# Patient Record
Sex: MALE | Race: WHITE | NOT HISPANIC OR LATINO | Employment: FULL TIME | URBAN - METROPOLITAN AREA
[De-identification: names, ages, dates, MRNs, and addresses within clinical notes are randomized per-mention and may not be internally consistent; named-entity substitution may affect disease eponyms.]

---

## 2018-09-26 ENCOUNTER — HISTORICAL (OUTPATIENT)
Dept: ADMINISTRATIVE | Facility: HOSPITAL | Age: 58
End: 2018-09-26

## 2018-09-26 LAB
ABS NEUT (OLG): 3.3
ALBUMIN SERPL-MCNC: 4.7 GM/DL (ref 3.4–5)
ALBUMIN/GLOB SERPL: 1.57 {RATIO} (ref 1.5–2.5)
ALP SERPL-CCNC: 65 UNIT/L (ref 38–126)
ALT SERPL-CCNC: 32 UNIT/L (ref 7–52)
APPEARANCE, UA: CLEAR
AST SERPL-CCNC: 26 UNIT/L (ref 15–37)
BACTERIA #/AREA URNS AUTO: ABNORMAL /HPF
BILIRUB SERPL-MCNC: 0.6 MG/DL (ref 0.2–1)
BILIRUB UR QL STRIP: NEGATIVE MG/DL
BILIRUBIN DIRECT+TOT PNL SERPL-MCNC: 0.1 MG/DL (ref 0–0.5)
BILIRUBIN DIRECT+TOT PNL SERPL-MCNC: 0.5 MG/DL
BUN SERPL-MCNC: 16 MG/DL (ref 7–18)
CALCIUM SERPL-MCNC: 9.3 MG/DL (ref 8.5–10)
CHLORIDE SERPL-SCNC: 102 MMOL/L (ref 98–107)
CHOLEST SERPL-MCNC: 244 MG/DL (ref 0–200)
CHOLEST/HDLC SERPL: 5 {RATIO}
CO2 SERPL-SCNC: 33 MMOL/L (ref 21–32)
COLOR UR: YELLOW
CREAT SERPL-MCNC: 1.05 MG/DL (ref 0.6–1.3)
ERYTHROCYTE [DISTWIDTH] IN BLOOD BY AUTOMATED COUNT: 12.4 % (ref 11.5–17)
GLOBULIN SER-MCNC: 3 GM/DL (ref 1.2–3)
GLUCOSE (UA): NEGATIVE MG/DL
GLUCOSE SERPL-MCNC: 103 MG/DL (ref 74–106)
HCT VFR BLD AUTO: 45.5 % (ref 42–52)
HDLC SERPL-MCNC: 49 MG/DL (ref 35–60)
HGB BLD-MCNC: 14.8 GM/DL (ref 14–18)
HGB UR QL STRIP: ABNORMAL UNIT/L
KETONES UR QL STRIP: NEGATIVE MG/DL
LDLC SERPL CALC-MCNC: 156 MG/DL (ref 0–129)
LEUKOCYTE ESTERASE UR QL STRIP: NEGATIVE UNIT/L
LYMPHOCYTES # BLD AUTO: 2 X10(3)/MCL (ref 0.6–3.4)
LYMPHOCYTES NFR BLD AUTO: 34.2 % (ref 13–40)
MCH RBC QN AUTO: 31.1 PG (ref 27–31.2)
MCHC RBC AUTO-ENTMCNC: 32 GM/DL (ref 32–36)
MCV RBC AUTO: 96 FL (ref 80–94)
MONOCYTES # BLD AUTO: 0.6 X10(3)/MCL (ref 0–1.8)
MONOCYTES NFR BLD AUTO: 10.5 % (ref 0.1–24)
NEUTROPHILS NFR BLD AUTO: 55.3 % (ref 47–80)
NITRITE UR QL STRIP.AUTO: NEGATIVE
PH UR STRIP: 7 [PH]
PLATELET # BLD AUTO: 365 X10(3)/MCL (ref 130–400)
PMV BLD AUTO: 8.5 FL
POTASSIUM SERPL-SCNC: 4 MMOL/L (ref 3.5–5.1)
PROT SERPL-MCNC: 7.7 GM/DL (ref 6.4–8.2)
PROT UR QL STRIP: NEGATIVE MG/DL
PSA SERPL-MCNC: 0.76 NG/ML (ref 0–3.5)
RBC # BLD AUTO: 4.76 X10(6)/MCL (ref 4.7–6.1)
RBC #/AREA URNS HPF: ABNORMAL /HPF
SODIUM SERPL-SCNC: 139 MMOL/L (ref 136–145)
SP GR UR STRIP: 1.02
SQUAMOUS EPITHELIAL, UA: ABNORMAL /LPF
TRIGL SERPL-MCNC: 172 MG/DL (ref 30–150)
UROBILINOGEN UR STRIP-ACNC: 0.2 MG/DL
VLDLC SERPL CALC-MCNC: 34.4 MG/DL
WBC # SPEC AUTO: 5.9 X10(3)/MCL (ref 4.5–11.5)
WBC #/AREA URNS AUTO: ABNORMAL /[HPF]

## 2019-09-27 ENCOUNTER — HISTORICAL (OUTPATIENT)
Dept: ADMINISTRATIVE | Facility: HOSPITAL | Age: 59
End: 2019-09-27

## 2019-09-27 LAB
ABS NEUT (OLG): 4 X10(3)/MCL (ref 2.1–9.2)
ALBUMIN SERPL-MCNC: 4.7 GM/DL (ref 3.4–5)
ALBUMIN/GLOB SERPL: 2.24 {RATIO} (ref 1.5–2.5)
ALP SERPL-CCNC: 76 UNIT/L (ref 38–126)
ALT SERPL-CCNC: 36 UNIT/L (ref 7–52)
APPEARANCE, UA: CLEAR
AST SERPL-CCNC: 30 UNIT/L (ref 15–37)
BACTERIA #/AREA URNS AUTO: ABNORMAL /HPF
BILIRUB SERPL-MCNC: 0.6 MG/DL (ref 0.2–1)
BILIRUB UR QL STRIP: NEGATIVE MG/DL
BILIRUBIN DIRECT+TOT PNL SERPL-MCNC: 0.1 MG/DL (ref 0–0.5)
BILIRUBIN DIRECT+TOT PNL SERPL-MCNC: 0.5 MG/DL
BUN SERPL-MCNC: 12 MG/DL (ref 7–18)
CALCIUM SERPL-MCNC: 9.7 MG/DL (ref 8.5–10)
CHLORIDE SERPL-SCNC: 102 MMOL/L (ref 98–107)
CHOLEST SERPL-MCNC: 250 MG/DL (ref 0–200)
CHOLEST/HDLC SERPL: 5 {RATIO}
CO2 SERPL-SCNC: 30 MMOL/L (ref 21–32)
COLOR UR: YELLOW
CREAT SERPL-MCNC: 0.81 MG/DL (ref 0.6–1.3)
ERYTHROCYTE [DISTWIDTH] IN BLOOD BY AUTOMATED COUNT: 12.5 % (ref 11.5–17)
GLOBULIN SER-MCNC: 2.1 GM/DL (ref 1.2–3)
GLUCOSE (UA): NEGATIVE MG/DL
GLUCOSE SERPL-MCNC: 107 MG/DL (ref 74–106)
HCT VFR BLD AUTO: 43.4 % (ref 42–52)
HDLC SERPL-MCNC: 50 MG/DL (ref 35–60)
HGB BLD-MCNC: 14.9 GM/DL (ref 14–18)
HGB UR QL STRIP: ABNORMAL UNIT/L
KETONES UR QL STRIP: NEGATIVE MG/DL
LDLC SERPL CALC-MCNC: 208 MG/DL (ref 0–129)
LEUKOCYTE ESTERASE UR QL STRIP: NEGATIVE UNIT/L
LYMPHOCYTES # BLD AUTO: 1.6 X10(3)/MCL (ref 0.6–3.4)
LYMPHOCYTES NFR BLD AUTO: 25.6 % (ref 13–40)
MCH RBC QN AUTO: 30.9 PG (ref 27–31.2)
MCHC RBC AUTO-ENTMCNC: 34 GM/DL (ref 32–36)
MCV RBC AUTO: 90 FL (ref 80–94)
MONOCYTES # BLD AUTO: 0.6 X10(3)/MCL (ref 0.1–1.3)
MONOCYTES NFR BLD AUTO: 10 % (ref 0.1–24)
NEUTROPHILS NFR BLD AUTO: 64.4 % (ref 47–80)
NITRITE UR QL STRIP.AUTO: NEGATIVE
PH UR STRIP: 5.5 [PH]
PLATELET # BLD AUTO: 390 X10(3)/MCL (ref 130–400)
PMV BLD AUTO: 8.7 FL (ref 9.4–12.4)
POTASSIUM SERPL-SCNC: 4.4 MMOL/L (ref 3.5–5.1)
PROT SERPL-MCNC: 6.8 GM/DL (ref 6.4–8.2)
PROT UR QL STRIP: NEGATIVE MG/DL
PSA SERPL-MCNC: 0.71 NG/ML (ref 0–3.5)
RBC # BLD AUTO: 4.82 X10(6)/MCL (ref 4.7–6.1)
RBC #/AREA URNS HPF: ABNORMAL /HPF
SODIUM SERPL-SCNC: 140 MMOL/L (ref 136–145)
SP GR UR STRIP: 1.02
SQUAMOUS EPITHELIAL, UA: ABNORMAL /LPF
TRIGL SERPL-MCNC: 129 MG/DL (ref 30–150)
UROBILINOGEN UR STRIP-ACNC: 0.2 MG/DL
VLDLC SERPL CALC-MCNC: 25.8 MG/DL
WBC # SPEC AUTO: 6.2 X10(3)/MCL (ref 4.5–11.5)
WBC #/AREA URNS AUTO: ABNORMAL /[HPF]

## 2021-03-11 ENCOUNTER — HISTORICAL (OUTPATIENT)
Dept: ADMINISTRATIVE | Facility: HOSPITAL | Age: 61
End: 2021-03-11

## 2021-03-11 LAB
ABS NEUT (OLG): 3.5 X10(3)/MCL (ref 2.1–9.2)
ALBUMIN SERPL-MCNC: 4.7 GM/DL (ref 3.4–5)
ALBUMIN/GLOB SERPL: 1.88 {RATIO} (ref 1.5–2.5)
ALP SERPL-CCNC: 64 UNIT/L (ref 38–126)
ALT SERPL-CCNC: 40 UNIT/L (ref 7–52)
APPEARANCE, UA: CLEAR
AST SERPL-CCNC: 33 UNIT/L (ref 15–37)
BACTERIA #/AREA URNS AUTO: ABNORMAL /HPF
BILIRUB SERPL-MCNC: 0.4 MG/DL (ref 0.2–1)
BILIRUB UR QL STRIP: NEGATIVE MG/DL
BILIRUBIN DIRECT+TOT PNL SERPL-MCNC: 0.1 MG/DL (ref 0–0.5)
BILIRUBIN DIRECT+TOT PNL SERPL-MCNC: 0.3 MG/DL
BUN SERPL-MCNC: 16 MG/DL (ref 7–18)
CALCIUM SERPL-MCNC: 10.3 MG/DL (ref 8.5–10.1)
CHLORIDE SERPL-SCNC: 101 MMOL/L (ref 98–107)
CHOLEST SERPL-MCNC: 236 MG/DL (ref 0–200)
CHOLEST/HDLC SERPL: 4.5 {RATIO}
CO2 SERPL-SCNC: 31 MMOL/L (ref 21–32)
COLOR UR: YELLOW
CREAT SERPL-MCNC: 0.88 MG/DL (ref 0.6–1.3)
ERYTHROCYTE [DISTWIDTH] IN BLOOD BY AUTOMATED COUNT: 12.4 % (ref 11.5–17)
EST. AVERAGE GLUCOSE BLD GHB EST-MCNC: 114 MG/DL
GLOBULIN SER-MCNC: 2.5 GM/DL (ref 1.2–3)
GLUCOSE (UA): NEGATIVE MG/DL
GLUCOSE SERPL-MCNC: 120 MG/DL (ref 74–106)
HBA1C MFR BLD: 5.6 % (ref 4.4–6.4)
HCT VFR BLD AUTO: 41.7 % (ref 42–52)
HDLC SERPL-MCNC: 53 MG/DL (ref 35–60)
HGB BLD-MCNC: 14.3 GM/DL (ref 14–18)
HGB UR QL STRIP: ABNORMAL UNIT/L
KETONES UR QL STRIP: NEGATIVE MG/DL
LDLC SERPL CALC-MCNC: 181 MG/DL (ref 0–129)
LEUKOCYTE ESTERASE UR QL STRIP: NEGATIVE UNIT/L
LYMPHOCYTES # BLD AUTO: 1.9 X10(3)/MCL (ref 0.6–3.4)
LYMPHOCYTES NFR BLD AUTO: 31.7 % (ref 13–40)
MCH RBC QN AUTO: 31.7 PG (ref 27–31.2)
MCHC RBC AUTO-ENTMCNC: 34 GM/DL (ref 32–36)
MCV RBC AUTO: 92 FL (ref 80–94)
MONOCYTES # BLD AUTO: 0.5 X10(3)/MCL (ref 0.1–1.3)
MONOCYTES NFR BLD AUTO: 9.3 % (ref 0.1–24)
NEUTROPHILS NFR BLD AUTO: 59 % (ref 47–80)
NITRITE UR QL STRIP.AUTO: NEGATIVE
PH UR STRIP: 6 [PH]
PLATELET # BLD AUTO: 377 X10(3)/MCL (ref 130–400)
PMV BLD AUTO: 9 FL (ref 9.4–12.4)
POTASSIUM SERPL-SCNC: 4.6 MMOL/L (ref 3.5–5.1)
PROT SERPL-MCNC: 7.2 GM/DL (ref 6.4–8.2)
PROT UR QL STRIP: NEGATIVE MG/DL
PSA SERPL-MCNC: 0.91 NG/ML (ref 0–3.5)
RBC # BLD AUTO: 4.51 X10(6)/MCL (ref 4.7–6.1)
RBC #/AREA URNS HPF: ABNORMAL /HPF
SODIUM SERPL-SCNC: 140 MMOL/L (ref 136–145)
SP GR UR STRIP: >1.03
SQUAMOUS EPITHELIAL, UA: ABNORMAL /LPF
TRIGL SERPL-MCNC: 113 MG/DL (ref 30–150)
UROBILINOGEN UR STRIP-ACNC: 0.2 MG/DL
VLDLC SERPL CALC-MCNC: 22.6 MG/DL
WBC # SPEC AUTO: 5.9 X10(3)/MCL (ref 4.5–11.5)
WBC #/AREA URNS AUTO: ABNORMAL /[HPF]

## 2021-06-24 ENCOUNTER — HISTORICAL (OUTPATIENT)
Dept: ADMINISTRATIVE | Facility: HOSPITAL | Age: 61
End: 2021-06-24

## 2021-06-24 LAB
ALBUMIN SERPL-MCNC: 4.7 GM/DL (ref 3.4–5)
ALBUMIN/GLOB SERPL: 1.96 {RATIO} (ref 1.5–2.5)
ALP SERPL-CCNC: 60 UNIT/L (ref 38–126)
ALT SERPL-CCNC: 31 UNIT/L (ref 7–52)
AST SERPL-CCNC: 27 UNIT/L (ref 15–37)
BILIRUB SERPL-MCNC: 0.6 MG/DL (ref 0.2–1)
BILIRUBIN DIRECT+TOT PNL SERPL-MCNC: 0.1 MG/DL (ref 0–0.5)
BILIRUBIN DIRECT+TOT PNL SERPL-MCNC: 0.5 MG/DL
BUN SERPL-MCNC: 20 MG/DL (ref 7–18)
CALCIUM SERPL-MCNC: 9.4 MG/DL (ref 8.5–10.1)
CHLORIDE SERPL-SCNC: 101 MMOL/L (ref 98–107)
CHOLEST SERPL-MCNC: 228 MG/DL (ref 0–200)
CHOLEST/HDLC SERPL: 4.6 {RATIO}
CO2 SERPL-SCNC: 29 MMOL/L (ref 21–32)
CREAT SERPL-MCNC: 0.96 MG/DL (ref 0.6–1.3)
GLOBULIN SER-MCNC: 2.4 GM/DL (ref 1.2–3)
GLUCOSE SERPL-MCNC: 115 MG/DL (ref 74–106)
HDLC SERPL-MCNC: 50 MG/DL (ref 35–60)
LDLC SERPL CALC-MCNC: 158 MG/DL (ref 0–129)
POTASSIUM SERPL-SCNC: 4.2 MMOL/L (ref 3.5–5.1)
PROT SERPL-MCNC: 7.1 GM/DL (ref 6.4–8.2)
SODIUM SERPL-SCNC: 138 MMOL/L (ref 136–145)
TRIGL SERPL-MCNC: 125 MG/DL (ref 30–150)
VLDLC SERPL CALC-MCNC: 25 MG/DL

## 2022-04-10 ENCOUNTER — HISTORICAL (OUTPATIENT)
Dept: ADMINISTRATIVE | Facility: HOSPITAL | Age: 62
End: 2022-04-10

## 2022-04-25 VITALS
SYSTOLIC BLOOD PRESSURE: 110 MMHG | HEIGHT: 62 IN | BODY MASS INDEX: 39.27 KG/M2 | WEIGHT: 213.38 LBS | DIASTOLIC BLOOD PRESSURE: 74 MMHG

## 2022-05-03 NOTE — HISTORICAL [facility]
This is a historical note converted from [facility]. Formatting and pictures may have been removed.  Please reference [facility] for original formatting and attached multimedia. Chief Complaint  CPX/FASTING  History of Present Illness  Patient is here today for wellness CPX. ?His blood pressure remains well controlled on Lotrel 10/20.? He did have Covid?in January and recovered without problems. ?He does not want to get the Covid vaccine.  Review of Systems  GENERAL:?no? unexplained wt ?loss or gain, fever, fatigue, chills, night sweats or ?weakness  HEENT: no?? sore throat, ?ear pain, ?sinus pressure, ?nasal congestion, or ?rhinorrhea  VISION:?no ?vision changes, ?glaucoma, cataracts, +glasses  CARDIAC: no? chest pain,??palpitations,?Dyspnea on exertion, ?orthopnea  RESPIRATORY:?no??cough,?wheezing, sputum production or?SOB--+ Covid in January  GI: no???abdominal pain, n&v,?constipation, diarrhea,??blood in stool or_?no ?family history of colon cancer?_  :?no? dysuria, ?hematuria, ?frequency, urgency, ?incontinence,? testicular pain/swelling,?_?no family history of prostate cancer_  MUSC/SKEL:? no? myalgia, ?weakness, edema,? + dip left index arthralgia, or ?no joint effusion  SKIN:? No?rash, hives,?itching or?sores--saw derm, had precancerous lesion removed from left thigh  NEURO:? No?headaches, numbness, tingling,? weakness, or ?dizziness  PSYCH:? No anxiety, depression, ?irritability, ?suicidal ideation or hallucinations  ENDO:? No ?polyuria, ?polydipsia, ?polyphagia  HEME:? No Bruising, lymphadenopathy, bleeding disorders ?or?signs of anemia  Physical Exam  Vitals & Measurements  T:?36.6? ?C (Oral)? HR:?64(Peripheral)? BP:?110/74?  HT:?157.00?cm? WT:?96.800?kg? BMI:?39.27?  GENERAL: NAD, alert and oriented x 3  SKIN:? no rash or abnormal appearing skin lesions  HEENT:? PERRLA, EOMI,? EAC and TM wnl bilaterally  NECK:? FROM, no lymphadenopathy, no thyroid abnormalities palpable  CHEST:? CTA bilaterally no wheezes,  crackles or rubs  CARDIAC:? RRR, no murmurs audible  ABDOMEN:? Soft, nontender, nondistended, NBSx4,?no rebound or guarding, no HSM  EXTREMITIES:? no clubbing, cyanosis, or edema.? joints wnl. +2 DP/PT pulse bilaterally  NEURO:? no sensory or motor deficits noted. CN II-XII intact. Gait wnl.?  GENITAL: normal testes, no hernia  RECTAL: no hemorrhoids or fissures, prostate WNL  Assessment/Plan  1.?Wellness examination?Z00.00  ?CBC, CMP, FLP, PSA, U/A, colonoscopy 2014 due 2024, Encourage pt to increase cardiovascular exercise and attempt to obtain at least 150 minutes of moderate aerobic exercise per week or 75 minutes of vigorous aerobic exercise weekly.  Ordered:  CBC w/ Auto Diff, Routine collect, 03/11/21 7:47:00 CST, Blood, Order for future visit, Stop date 03/11/21 7:47:00 CST, Lab Collect, Wellness examination, 03/11/21 7:47:00 CST  Clinic Follow-Up Wellness, *Est. 03/11/22 3:00:00 CST, Order for future visit, Wellness examination  HTN (hypertension), HLink AFP  Comprehensive Metabolic Panel, Routine collect, 03/11/21 7:47:00 CST, Blood, Order for future visit, Stop date 03/11/21 7:47:00 CST, Lab Collect, Wellness examination  HTN (hypertension), 03/11/21 7:47:00 CST  Lab Collection Request, 03/11/21 7:47:00 CST, HLINK AMB - AFP, 03/11/21 7:47:00 CST, Wellness examination  HTN (hypertension)  Lipid Panel, Routine collect, 03/11/21 7:47:00 CST, Blood, Order for future visit, Stop date 03/11/21 7:47:00 CST, Lab Collect, Wellness examination  HTN (hypertension), 03/11/21 7:47:00 CST  Preventative Health Care Est 40-64 years [address]42 PC, Wellness examination  HTN (hypertension), HLINK AMB - AFP, 03/11/21 7:47:00 CST  Urinalysis no Reflex, Routine collect, Urine, Order for future visit, 03/11/21 7:47:00 CST, Stop date 03/11/21 7:47:00 CST, Nurse collect, Wellness examination  HTN (hypertension)  ?  2.?Prostate cancer screening?Z12.5  ?PSA  Ordered:  Prostate Specific Antigen, Routine collect, 03/11/21 7:47:00  CST, Blood, Order for future visit, Stop date 03/11/21 7:47:00 CST, Lab Collect, HTN (hypertension)  Prostate cancer screening, 03/11/21 7:47:00 CST  ?  3.?Immunization due?Z23  decline covid, begin shingrix  Ordered:  zoster vaccine, inactivated, 0.5 mL, IM, Once-NOW, first dose 03/11/21 7:47:00 CST, stop date 03/11/21 7:47:00 CST  ?  4.?HTN (hypertension)?I10  ?continue Lotrel 10/20  Ordered:  Clinic Follow-Up Wellness, *Est. 03/11/22 3:00:00 CST, Order for future visit, Wellness examination  HTN (hypertension), HLink AFP  Comprehensive Metabolic Panel, Routine collect, 03/11/21 7:47:00 CST, Blood, Order for future visit, Stop date 03/11/21 7:47:00 CST, Lab Collect, Wellness examination  HTN (hypertension), 03/11/21 7:47:00 CST  Lab Collection Request, 03/11/21 7:47:00 CST, HLINK AMB - AFP, 03/11/21 7:47:00 CST, Wellness examination  HTN (hypertension)  Lipid Panel, Routine collect, 03/11/21 7:47:00 CST, Blood, Order for future visit, Stop date 03/11/21 7:47:00 CST, Lab Collect, Wellness examination  HTN (hypertension), 03/11/21 7:47:00 CST  Preventative Health Care Est 40-64 years [address]184 PC, Wellness examination  HTN (hypertension), HLINK AMB - AFP, 03/11/21 7:47:00 CST  Prostate Specific Antigen, Routine collect, 03/11/21 7:47:00 CST, Blood, Order for future visit, Stop date 03/11/21 7:47:00 CST, Lab Collect, HTN (hypertension)  Prostate cancer screening, 03/11/21 7:47:00 CST  Urinalysis no Reflex, Routine collect, Urine, Order for future visit, 03/11/21 7:47:00 CST, Stop date 03/11/21 7:47:00 CST, Nurse collect, Wellness examination  HTN (hypertension)  ?  Orders:  amlodipine-benazepril, 1 cap(s), Oral, Daily, # 90 cap(s), 3 Refill(s), Pharmacy: [facility], 157, cm, Height/Length Dosing, 03/11/21 7:32:00 CST, 96.8, kg, Weight Dosing, 03/11/21 7:32:00 CST  Referrals  Clinic Follow-Up Wellness, *Est. 03/11/22 3:00:00 CST, Order for future visit, Wellness examination  HTN (hypertension), HLink AFP    Problem List/Past Medical History  Ongoing  Immunization due  Obesity  Wellness examination  Historical  GERD - Gastro-esophageal reflux disease  Hypertension  Nephrolithiasis  Procedure/Surgical History  Colonoscopy (02.2014)  Tonsillectomy (1973)   Medications  amlodipine-benazepril 10 mg-20 mg oral capsule, 1 cap(s), Oral, Daily, 3 refills  Shingrix, 0.5 mL, IM, Once-NOW  Allergies  No Known Medication Allergies  Social History  Abuse/Neglect  No, 03/11/2021  No, 09/27/2019  Alcohol  Current, 1-2 times per week, 09/26/2018  Employment/School  Employed, 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  Celiac disease: Mother.  Dyslexia: Sister.  Macular degeneration of both eyes: Mother.  Peripheral vascular disease.: Father.  Primary malignant neoplasm of lung: Father.  Immunizations  Vaccine Date Status   tetanus/diphth/pertuss (Tdap) adult/adol 07/31/2017 Recorded   tetanus/diphth/pertuss (Tdap) adult/adol 09/11/2007 Recorded   Health Maintenance  Health Maintenance  ???Pending?(in the next year)  ??? ??OverDue  ??? ? ? ?Hypertension Management-BMP due??09/26/20??and every 1??year(s)  ??? ? ? ?Influenza Vaccine due??10/01/20??and every 1??day(s)  ??? ? ? ?Alcohol Misuse Screening due??01/02/21??and every 1??year(s)  ??? ??Due?  ??? ? ? ?ADL Screening due??03/11/21??and every 1??year(s)  ??? ? ? ?Aspirin Therapy for CVD Prevention due??03/11/21??and every 1??year(s)  ??? ? ? ?Depression Screening due??03/11/21??Unknown Frequency  ??? ? ? ?Zoster Vaccine due??03/11/21??Unknown Frequency  ??? ??Due In Future?  ??? ? ? ?Obesity Screening not due until??01/01/22??and every  1??year(s)  ???Satisfied?(in the past 1 year)  ??? ??Satisfied?  ??? ? ? ?Blood Pressure Screening on??03/11/21.??Satisfied by Monica Sharp CMA  ??? ? ? ?Body Mass Index Check on??03/11/21.??Satisfied by Monica Sharp CMA  ??? ? ? ?Hypertension Management-Blood Pressure on??03/11/21.??Satisfied by Monica Sharp CMA  ??? ? ? ?Influenza Vaccine on??03/11/21.??Satisfied by Monica Sharp CMA  ??? ? ? ?Obesity Screening on??03/11/21.??Satisfied by Monica Sharp CMA  ?

## 2022-05-03 NOTE — HISTORICAL [facility]
This is a historical note converted from [facility]. Formatting and pictures may have been removed.  Please reference [facility] for original formatting and attached multimedia. Chief Complaint  WELLNESS/FASTING  History of Present Illness  Patient is here today for wellness CPX.? He continues to take Lotrel without any side effects. ?His blood pressure remains well controlled.  Review of Systems  GENERAL:?no? unexplained wt ?loss or gain, fever, fatigue, chills, night sweats or ?weakness  HEENT: no?? sore throat, ?ear pain, ?sinus pressure, ?nasal congestion, or ?rhinorrhea  VISION:?no ?vision changes, ?glaucoma, cataracts? +glasses  CARDIAC: no? chest pain,??palpitations,?Dyspnea on exertion, ?orthopnea  RESPIRATORY:?no??cough,?wheezing, sputum production or?SOB  GI: no???abdominal pain, n&v,?constipation, diarrhea,??blood in stool or_?? no? family history of colon cancer?_  :?no? dysuria, ?hematuria, ?frequency, urgency, ?incontinence,? testicular pain/swelling,?_?? no? family history of prostate cancer_  MUSC/[facility]:? no? myalgia, ?weakness, edema,? + ankle arthralgia, or ?joint effusion  SKIN:? No?rash, hives,?itching or?sores +seborrheic dermatitis  NEURO:? No?headaches, numbness, tingling,? weakness, or ?dizziness  PSYCH:? No anxiety, depression, ?irritability, ?suicidal ideation or hallucinations  ENDO:? No ?polyuria, ?polydipsia, ?polyphagia  HEME:? No Bruising, lymphadenopathy, bleeding disorders ?or?signs of anemia  Physical Exam  Vitals & Measurements  HR:?64(Peripheral)? BP:?102/70?  HT:?157?cm? HT:?157?cm? WT:?93.5?kg? WT:?93.5?kg? BMI:?37.93?  GENERAL: NAD, alert and oriented x 3  SKIN:? no rash or abnormal appearing skin lesions  HEENT:? PERRLA, EOMI, mouth wnl, throat wnl, EAC and TM wnl bilaterally  NECK:? FROM, no lymphadenopathy, no thyroid abnormalities palpable  CHEST:? CTA bilaterally no wheezes, crackles or rubs  CARDIAC:? RRR, no murmurs audible  ABDOMEN:? Soft, nontender, nondistended, NBSx4,?no  rebound or guarding, no HSM  EXTREMITIES:? no clubbing, cyanosis, or edema.? joints wnl. +2 DP/PT pulse bilaterally  NEURO:? no sensory or motor deficits noted. CN II-XII intact. Gait wnl.?  GENITAL: normal testes, no hernia  RECTAL: no hemorrhoids or fissures, prostate WNL  Assessment/Plan  1.?Wellness examination  ?decline flu shot, ASA 81 mg q d, CBC, CMP, FLP, U/A, PSA, Colonoscopy 2014, due 2024,?encourage patient to increase cardiovascular exercise and attempt?to obtain at least 150 minutes of moderate aerobic exercise per week.  Ordered:  Clinic Follow up, *Est. 09/27/19 10:00:00 CDT, Order for future visit, Wellness examination, ink AFP  Comprehensive Metabolic Panel, Routine collect, 09/26/18 11:51:00 CDT, Blood, Stop date 09/26/18 11:51:00 CDT, Lab Collect, Wellness examination, 09/26/18 11:51:00 CDT  Lipid Panel, Routine collect, 09/26/18 11:51:00 CDT, Blood, Stop date 09/26/18 11:51:00 CDT, Lab Collect, Wellness examination, 09/26/18 11:51:00 CDT  Preventative Health Care Est 40-64 years [address] PC, Wellness examination, HLINK AMB - AFP, 09/26/18 11:47:00 CDT  ?  2.?Hypertension  ?continue lotrel 10/20  ?  3.?Prostate cancer screening  ?PSA  Ordered:  Prostate Specific Antigen, Routine collect, 09/26/18 11:51:00 CDT, Blood, Stop date 09/26/18 11:51:00 CDT, Lab Collect, Prostate cancer screening, 09/26/18 11:51:00 CDT  ?  Orders:  amlodipine-benazepril, 1 cap(s), Oral, Daily, # 30 cap(s), 11 Refill(s), Pharmacy: [facility] - [facility]  aspirin, 81 mg = 1 tab(s), Oral, Daily, # 30 tab(s), 11 Refill(s), other reason (Rx)   Problem List/Past Medical History  Ongoing  Immunization due  Obesity  Wellness examination  Historical  GERD - Gastro-esophageal reflux disease  Hypertension  Nephrolithiasis  Procedure/Surgical History  Colonoscopy (02/2014)  Tonsillectomy (1973)   Medications  amlodipine-benazepril 10 mg-20 mg oral capsule, 1 cap(s), Oral, Daily, 11 refills  aspirin 81 mg oral tablet, 81 mg= 1  tab(s), Oral, Daily, 11 refills  Allergies  No Known Medication Allergies  Social History  Alcohol  Current, 1-2 times per week, 09/26/2018  Employment/School  Employed, Work/School description: [employer]., 07/31/2018  Home/Environment  Lives with Spouse., 07/31/2018  Tobacco  Former smoker Use:., 07/31/2018  Family History  [place] disease: Sister.  Celiac disease: Mother.  Dyslexia: Sister.  Macular degeneration of both eyes: Mother.  Peripheral vascular disease.: Father.  Primary malignant neoplasm of lung: Father.  Immunizations  Vaccine Date Status   tetanus/diphth/pertuss (Tdap) adult/adol 07/31/2017 Recorded   tetanus/diphth/pertuss (Tdap) adult/adol 09/11/2007 Recorded   Health Maintenance  Health Maintenance  ???Pending?(in the next year)  ??? ??Due?  ??? ? ? ?ADL Screening due??09/26/18??and every 1??year(s)  ??? ? ? ?Alcohol Misuse Screening due??09/26/18??and every 1??year(s)  ??? ? ? ?Depression Screening due??09/26/18??and every?  ??? ? ? ?Diabetes Screening due??09/26/18??and every?  ??? ? ? ?Hypertension Management-BMP due??09/26/18??and every?  ??? ? ? ?Influenza Vaccine due??09/26/18??and every?  ??? ? ? ?Lipid Screening due??09/26/18??and every?  ???Satisfied?(in the past 1 year)  ??? ??Satisfied?  ??? ? ? ?Aspirin Therapy for CVD Prevention on??09/26/18.??Satisfied by Josué Simms MD  ??? ? ? ?Blood Pressure Screening on??09/26/18.??Satisfied by Monica Sharp  ??? ? ? ?Body Mass Index Check on??09/26/18.??Satisfied by Monica Sharp  ??? ? ? ?Diabetes Screening on??09/26/18.??Satisfied by Josué Simms MD  ??? ? ? ?Hypertension Management-Blood Pressure on??09/26/18.??Satisfied by Monica Sharp  ??? ? ? ?Influenza Vaccine on??09/26/18.??Satisfied by Monica Sharp  ??? ? ? ?Lipid Screening on??09/26/18.??Satisfied by Josué Simms MD  ??? ? ? ?Obesity Screening on??09/26/18.??Satisfied by Monica Sharp  ?  ?

## 2022-05-03 NOTE — HISTORICAL [facility]
This is a historical note converted from [facility]. Formatting and pictures may have been removed.  Please reference [facility] for original formatting and attached multimedia. Chief Complaint  CPX/FASTING  History of Present Illness  Patient is here today for wellness CPX. ?His blood pressure remains well controlled on current medication. ?He is having no side effects.? 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  Review of Systems  GENERAL:?no? unexplained wt ?[id number] , no fever, fatigue, chills, night sweats or ?weakness  HEENT: no?? sore throat, ?ear pain, ?sinus pressure, ?nasal congestion, or ?rhinorrhea  VISION:?no ?vision changes, ?glaucoma, cataracts, +glasses  CARDIAC: no? chest pain,??palpitations,?Dyspnea on exertion, ?orthopnea  RESPIRATORY:?no??cough,?wheezing, sputum production or?SOB  GI: no???abdominal pain, n&v,?constipation, diarrhea,??blood in stool or_no ?family history of colon cancer?_  :?no? dysuria, ?hematuria, ?frequency, urgency, ?incontinence,? testicular pain/swelling,?_?no family history of prostate cancer_  MUSC/SKEL:? no? myalgia, ?weakness, edema,? arthralgia, or ?joint effusion  SKIN:? No?rash, hives,?itching or?sores--sees Derm  NEURO:? No?headaches, numbness, tingling,? weakness, or ?dizziness  PSYCH:? No anxiety, depression, ?irritability, ?suicidal ideation or hallucinations  ENDO:? No ?polyuria, ?polydipsia, ?polyphagia  HEME:? No Bruising, lymphadenopathy, bleeding disorders ?or?signs of anemia  Physical Exam  Vitals & Measurements  HR:?72(Peripheral)? BP:?102/74?  HT:?157?cm? WT:?97.2?kg? BMI:?39.43?  GENERAL: NAD, alert and oriented x 3  SKIN:? no rash or abnormal appearing skin lesions  HEENT:? PERRLA, EOMI, mouth wnl, throat wnl, EAC and TM wnl bilaterally  NECK:? FROM, no lymphadenopathy, no thyroid abnormalities palpable  CHEST:? CTA bilaterally no wheezes, crackles or rubs  CARDIAC:? RRR, no murmurs audible  ABDOMEN:? Soft, nontender, nondistended,  NBSx4,?no rebound or guarding, no HSM  EXTREMITIES:? no clubbing, cyanosis, or edema.? joints wnl. +2 DP/PT pulse bilaterally  NEURO:? no sensory or motor deficits noted. CN II-XII intact. Gait wnl.?  GENITAL: normal testes, no hernia  RECTAL: no hemorrhoids or fissures, prostate WNL  Assessment/Plan  1.?Wellness examination?Z00.00  D/C ASA, ?CBC, CMP, FLP, U/A, PSA, colonoscopy 2014 due 2024, Encourage pt to increase cardiovascular exercise and attempt to obtain at least 150 minutes of moderate aerobic exercise per week or 75 minutes of vigorous aerobic exercise weekly.  Ordered:  CBC w/ Auto Diff, Routine collect, 09/27/19 8:48:00 CDT, Blood, Order for future visit, Stop date 09/27/19 8:48:00 CDT, Lab Collect, Wellness examination, 09/27/19 8:48:00 CDT  Clinic Follow-Up Wellness, *Est. 09/27/20 3:00:00 CDT, Order for future visit, Wellness examination, HLink AFP  Comprehensive Metabolic Panel, Routine collect, 09/27/19 8:48:00 CDT, Blood, Order for future visit, Stop date 09/27/19 8:48:00 CDT, Lab Collect, Wellness examination  Hypertension, 09/27/19 8:48:00 CDT  Lipid Panel, Routine collect, 09/27/19 8:48:00 CDT, Blood, Order for future visit, Stop date 09/27/19 8:48:00 CDT, Lab Collect, Wellness examination  Hypertension, 09/27/19 8:48:00 CDT  Preventative Health Care Est 40-64 years [id number] PC, Wellness examination  Hypertension, HLINK AMB - AFP, 09/27/19 8:48:00 CDT  Urinalysis Complete no reflex, Routine collect, Urine, Order for future visit, 09/27/19 8:48:00 CDT, Stop date 09/27/19 8:48:00 CDT, Nurse collect, Wellness examination  Hypertension  ?  2.?Immunization due?Z23  ?flu shot decline  Ordered:  Prostate Specific Antigen, Routine collect, 09/27/19 8:48:00 CDT, Blood, Order for future visit, Stop date 09/27/19 8:48:00 CDT, Lab Collect, Immunization due, 09/27/19 8:48:00 CDT  ?  3.?Hypertension?I10  ?continue lotrel 10/20  Ordered:  Comprehensive Metabolic Panel, Routine collect, 09/27/19 8:48:00 CDT,  Blood, Order for future visit, Stop date 09/27/19 8:48:00 CDT, Lab Collect, Wellness examination  Hypertension, 09/27/19 8:48:00 CDT  Lipid Panel, Routine collect, 09/27/19 8:48:00 CDT, Blood, Order for future visit, Stop date 09/27/19 8:48:00 CDT, Lab Collect, Wellness examination  Hypertension, 09/27/19 8:48:00 CDT  Preventative Health Care Est 40-64 years [id number] PC, Wellness examination  Hypertension, HLINK AMB - AFP, 09/27/19 8:48:00 CDT  Urinalysis Complete no reflex, Routine collect, Urine, Order for future visit, 09/27/19 8:48:00 CDT, Stop date 09/27/19 8:48:00 CDT, Nurse collect, Wellness examination  Hypertension  ?  4.?GERD - Gastro-esophageal reflux disease?K21.9  ok off meds  ?  5.?Nephrolithiasis?N20.0  ?hx of  ?  Orders:  amlodipine-benazepril, 1 cap(s), Oral, Daily, # 30 cap(s), 11 Refill(s), Pharmacy: [facility]  [facility]  Lab Collection Request, 09/27/19 8:48:00 CDT, HLINK AMB - AFP, 09/27/19 8:48:00 CDT  Referrals  Clinic Follow-Up Wellness, *Est. 09/27/20 3:00:00 CDT, Order for future visit, Wellness examination, HLink AFP   Problem List/Past Medical History  Ongoing  Immunization due  Obesity  Wellness examination  Historical  GERD - Gastro-esophageal reflux disease  Hypertension  Nephrolithiasis  Procedure/Surgical History  Colonoscopy (02.2014)  Tonsillectomy (1973)   Medications  amlodipine-benazepril 10 mg-20 mg oral capsule, 1 cap(s), Oral, Daily, 11 refills  Allergies  No Known Medication Allergies  Social History  Abuse/Neglect  No, 09/27/2019  Alcohol  Current, 1-2 times per week, 09/26/2018  Employment/School  Employed, Work/School description: [employer]., 07/31/2018  Home/Environment  Lives with Spouse., 07/31/2018  Tobacco  Former smoker, quit more than 30 days ago, N/A, 09/27/2019  Former smoker Use:., 07/31/2018  Family History  Elder disease: Sister.  Celiac disease: Mother.  Dyslexia: Sister.  Macular degeneration of both eyes: Mother.  Peripheral vascular  disease.: Father.  Primary malignant neoplasm of lung: Father.  Immunizations  Vaccine Date Status   tetanus/diphth/pertuss (Tdap) adult/adol 07/31/2017 Recorded   tetanus/diphth/pertuss (Tdap) adult/adol 09/11/2007 Recorded   Health Maintenance  Health Maintenance  ???Pending?(in the next year)  ??? ??OverDue  ??? ? ? ?Alcohol Misuse Screening due??01/01/19??and every 1??year(s)  ??? ??Due?  ??? ? ? ?ADL Screening due??09/27/19??and every 1??year(s)  ??? ? ? ?Aspirin Therapy for CVD Prevention due??09/27/19??and every 1??year(s)  ??? ??Due In Future?  ??? ? ? ?Obesity Screening not due until??01/01/20??and every 1??year(s)  ???Satisfied?(in the past 1 year)  ??? ??Satisfied?  ??? ? ? ?Obesity Screening on??09/27/19.??Satisfied by Monica Sharp CMA  ?

## 2022-08-10 PROBLEM — K21.9 GASTROESOPHAGEAL REFLUX DISEASE: Status: ACTIVE | Noted: 2022-08-10

## 2022-08-10 PROBLEM — Z23 IMMUNIZATION DUE: Status: ACTIVE | Noted: 2022-08-10

## 2022-08-10 PROBLEM — Z12.5 PROSTATE CANCER SCREENING: Status: ACTIVE | Noted: 2022-08-10

## 2022-08-10 PROBLEM — R73.9 HYPERGLYCEMIA: Status: ACTIVE | Noted: 2022-08-10

## 2022-08-10 PROBLEM — Z00.00 ENCOUNTER FOR WELLNESS EXAMINATION IN ADULT: Status: ACTIVE | Noted: 2022-08-10

## 2022-08-10 PROBLEM — I10 PRIMARY HYPERTENSION: Status: ACTIVE | Noted: 2022-08-10

## 2022-08-10 PROBLEM — E66.9 OBESITY: Status: ACTIVE | Noted: 2022-08-10

## 2022-11-14 PROBLEM — Z00.00 ENCOUNTER FOR WELLNESS EXAMINATION IN ADULT: Status: RESOLVED | Noted: 2022-08-10 | Resolved: 2022-11-14

## 2023-09-15 PROCEDURE — 87389 HIV-1 AG W/HIV-1&-2 AB AG IA: CPT | Performed by: FAMILY MEDICINE

## 2023-09-15 PROCEDURE — 86803 HEPATITIS C AB TEST: CPT | Performed by: FAMILY MEDICINE

## 2023-12-18 PROBLEM — Z00.00 ENCOUNTER FOR WELLNESS EXAMINATION IN ADULT: Status: RESOLVED | Noted: 2022-08-10 | Resolved: 2023-12-18
